# Patient Record
Sex: FEMALE | Race: OTHER | NOT HISPANIC OR LATINO | URBAN - METROPOLITAN AREA
[De-identification: names, ages, dates, MRNs, and addresses within clinical notes are randomized per-mention and may not be internally consistent; named-entity substitution may affect disease eponyms.]

---

## 2017-12-22 ENCOUNTER — EMERGENCY (EMERGENCY)
Facility: HOSPITAL | Age: 42
LOS: 1 days | Discharge: HOME | End: 2017-12-22
Attending: EMERGENCY MEDICINE
Payer: SELF-PAY

## 2017-12-22 VITALS
WEIGHT: 149.91 LBS | SYSTOLIC BLOOD PRESSURE: 114 MMHG | HEART RATE: 80 BPM | TEMPERATURE: 98 F | OXYGEN SATURATION: 98 % | HEIGHT: 63 IN | DIASTOLIC BLOOD PRESSURE: 80 MMHG | RESPIRATION RATE: 16 BRPM

## 2017-12-22 PROCEDURE — 99282 EMERGENCY DEPT VISIT SF MDM: CPT

## 2017-12-22 PROCEDURE — 99283 EMERGENCY DEPT VISIT LOW MDM: CPT

## 2017-12-22 NOTE — ED PROVIDER NOTE - LOWER EXTREMITY EXAM, RIGHT
no R knee bony tenderness; FROM of R knee; valgus/varus stress test negative, negative anterior/posterior drawer test

## 2017-12-22 NOTE — ED ADULT TRIAGE NOTE - CHIEF COMPLAINT QUOTE
[place] s[place], c/o right knee and left elbow pain, denies any head, neck pain/involvement

## 2017-12-22 NOTE — ED PROVIDER NOTE - OBJECTIVE STATEMENT
41 y/o F pt with no PMHx and no PSHx presents to ED c/o R knee pain and R elbow pain s/p mechanical fall today. Pt reports being able to ambulate since the fall. Per pt, pt took Ibuprofen 600mg PTA in ED. Pt denies numbness, tingling, or any other complaints. Pt also denies any other injuries from fall. NKDA.

## 2017-12-22 NOTE — ED PROVIDER NOTE - MEDICAL DECISION MAKING DETAILS
41 y/o F pt presents with R knee pain and R elbow pain s/p mechanical fall. Likely contusion, able to ambulate with normal gait. Discussed with pt about no need for X-Rays. Pt will take Ibuprofen. Will d/c home.

## 2024-12-06 NOTE — ED ADULT TRIAGE NOTE - BP NONINVASIVE SYSTOLIC (MM HG)
Operative report    Preop diagnosis: Right inguinal hernia  Postop diagnosis: Same    Operative procedure: Open mesh repair right inguinal hernia    Surgeon: TYESHA Elaine  Assistant: TIGIST Najera  Anesthesia: IV sedation and MAC anesthesia    Indications for procedure: Patient is presents with symptomatic right inguinal hernia.  Full informed consent was obtain in clinic and reconfirmed in today's preoperative discussion.    Operative findings: Right inguinal indirect hernia.    Procedure: Brought to the operating room put under IV sedation right inguinal region widely prepped and draped in usual sterile fashion.  Filtration of Marcaine was placed at skin level and is a local field block under the lateral aspects of the external oblique.  Dissection carried down to external oblique was skin incision was made was opened to the external ring.  An indirect sac was easily identified and dissected off of the surrounding soft tissue.  Round ligament was isolated clamped proximally distally and secured with 0 Vicryl.  Was transected thus giving free access to the direct sac.  String of 3-0 Vicryl was used to invert the second to the preperitoneal space.  Initiated my routine mesh repair using a 15 x 9 cm pariah Masoud ProGrip mesh which was cut to size this medially to rectus fascia inferiorly along the inguinal ligament and rolled out laterally to completely cover the internal oblique musculature.  The lingual nerve was identified and free of our dissection.  Oblique was then closed with running Vicryl stitch.  Subcuticular.    Estimated blood loss: Minimal  Pathology: None  The patient was taken to the recovery room where she was without difficulty or apparent complication.    [id number]